# Patient Record
Sex: FEMALE | Race: WHITE | Employment: OTHER | URBAN - METROPOLITAN AREA
[De-identification: names, ages, dates, MRNs, and addresses within clinical notes are randomized per-mention and may not be internally consistent; named-entity substitution may affect disease eponyms.]

---

## 2018-08-10 ENCOUNTER — HOSPITAL ENCOUNTER (OUTPATIENT)
Dept: CT IMAGING | Age: 78
Discharge: HOME | DRG: 389 | End: 2018-08-10
Attending: PHYSICIAN ASSISTANT
Payer: MEDICARE

## 2018-08-10 ENCOUNTER — HOSPITAL ENCOUNTER (INPATIENT)
Age: 78
LOS: 1 days | Discharge: HOME | DRG: 389 | End: 2018-08-11
Attending: EMERGENCY MEDICINE | Admitting: INTERNAL MEDICINE
Payer: MEDICARE

## 2018-08-10 DIAGNOSIS — C50.911 MALIGNANT NEOPLASM OF RIGHT BREAST IN FEMALE, ESTROGEN RECEPTOR POSITIVE, UNSPECIFIED SITE OF BREAST (HCC): ICD-10-CM

## 2018-08-10 DIAGNOSIS — E83.42 HYPOMAGNESEMIA: ICD-10-CM

## 2018-08-10 DIAGNOSIS — K59.00 CONSTIPATION: ICD-10-CM

## 2018-08-10 DIAGNOSIS — R10.9 STOMACH CRAMPS: ICD-10-CM

## 2018-08-10 DIAGNOSIS — E87.6 HYPOKALEMIA: ICD-10-CM

## 2018-08-10 DIAGNOSIS — Z17.0 MALIGNANT NEOPLASM OF RIGHT BREAST IN FEMALE, ESTROGEN RECEPTOR POSITIVE, UNSPECIFIED SITE OF BREAST (HCC): ICD-10-CM

## 2018-08-10 DIAGNOSIS — K56.600 PARTIAL SMALL BOWEL OBSTRUCTION (HCC): Primary | ICD-10-CM

## 2018-08-10 DIAGNOSIS — R10.30 LOWER ABDOMINAL PAIN, UNSPECIFIED: ICD-10-CM

## 2018-08-10 PROBLEM — E46 MALNUTRITION (HCC): Status: ACTIVE | Noted: 2018-08-10

## 2018-08-10 PROBLEM — D64.9 ANEMIA: Status: ACTIVE | Noted: 2018-08-10

## 2018-08-10 PROBLEM — R63.4 WEIGHT LOSS: Status: ACTIVE | Noted: 2018-08-10

## 2018-08-10 PROBLEM — D72.829 LEUKOCYTOSIS: Status: ACTIVE | Noted: 2018-08-10

## 2018-08-10 LAB
ANION GAP SERPL CALC-SCNC: 11 MMOL/L (ref 5–15)
APPEARANCE UR: CLEAR
ATRIAL RATE: 91 BPM
BACTERIA URNS QL MICRO: NEGATIVE /HPF
BASOPHILS # BLD: 0 K/UL (ref 0–0.1)
BASOPHILS NFR BLD: 0 % (ref 0–1)
BILIRUB UR QL: NEGATIVE
BUN SERPL-MCNC: 15 MG/DL (ref 6–20)
BUN/CREAT SERPL: 16 (ref 12–20)
CALCIUM SERPL-MCNC: 8.7 MG/DL (ref 8.5–10.1)
CALCULATED R AXIS, ECG10: -41 DEGREES
CALCULATED T AXIS, ECG11: 36 DEGREES
CHLORIDE SERPL-SCNC: 100 MMOL/L (ref 97–108)
CO2 SERPL-SCNC: 27 MMOL/L (ref 21–32)
COLOR UR: ABNORMAL
CREAT BLD-MCNC: 1 MG/DL (ref 0.6–1.3)
CREAT SERPL-MCNC: 0.91 MG/DL (ref 0.55–1.02)
DIAGNOSIS, 93000: NORMAL
DIFFERENTIAL METHOD BLD: ABNORMAL
EOSINOPHIL # BLD: 0 K/UL (ref 0–0.4)
EOSINOPHIL NFR BLD: 0 % (ref 0–7)
EPITH CASTS URNS QL MICRO: ABNORMAL /LPF
ERYTHROCYTE [DISTWIDTH] IN BLOOD BY AUTOMATED COUNT: 14.2 % (ref 11.5–14.5)
GLUCOSE SERPL-MCNC: 108 MG/DL (ref 65–100)
GLUCOSE UR STRIP.AUTO-MCNC: NEGATIVE MG/DL
HCT VFR BLD AUTO: 33.1 % (ref 35–47)
HGB BLD-MCNC: 10.9 G/DL (ref 11.5–16)
HGB UR QL STRIP: ABNORMAL
HYALINE CASTS URNS QL MICRO: ABNORMAL /LPF (ref 0–5)
IMM GRANULOCYTES # BLD: 0 K/UL (ref 0–0.04)
IMM GRANULOCYTES NFR BLD AUTO: 0 % (ref 0–0.5)
KETONES UR QL STRIP.AUTO: NEGATIVE MG/DL
LEUKOCYTE ESTERASE UR QL STRIP.AUTO: NEGATIVE
LYMPHOCYTES # BLD: 0.9 K/UL (ref 0.8–3.5)
LYMPHOCYTES NFR BLD: 7 % (ref 12–49)
MAGNESIUM SERPL-MCNC: 1.4 MG/DL (ref 1.6–2.4)
MCH RBC QN AUTO: 29.3 PG (ref 26–34)
MCHC RBC AUTO-ENTMCNC: 32.9 G/DL (ref 30–36.5)
MCV RBC AUTO: 89 FL (ref 80–99)
MONOCYTES # BLD: 0.9 K/UL (ref 0–1)
MONOCYTES NFR BLD: 8 % (ref 5–13)
NEUTS SEG # BLD: 9.8 K/UL (ref 1.8–8)
NEUTS SEG NFR BLD: 84 % (ref 32–75)
NITRITE UR QL STRIP.AUTO: NEGATIVE
NRBC # BLD: 0 K/UL (ref 0–0.01)
NRBC BLD-RTO: 0 PER 100 WBC
PH UR STRIP: 6 [PH] (ref 5–8)
PLATELET # BLD AUTO: 249 K/UL (ref 150–400)
PMV BLD AUTO: 9.9 FL (ref 8.9–12.9)
POTASSIUM SERPL-SCNC: 2.4 MMOL/L (ref 3.5–5.1)
PROT UR STRIP-MCNC: NEGATIVE MG/DL
Q-T INTERVAL, ECG07: 370 MS
QRS DURATION, ECG06: 82 MS
QTC CALCULATION (BEZET), ECG08: 450 MS
RBC # BLD AUTO: 3.72 M/UL (ref 3.8–5.2)
RBC #/AREA URNS HPF: ABNORMAL /HPF (ref 0–5)
SODIUM SERPL-SCNC: 138 MMOL/L (ref 136–145)
SP GR UR REFRACTOMETRY: <1.005 (ref 1–1.03)
UR CULT HOLD, URHOLD: NORMAL
UROBILINOGEN UR QL STRIP.AUTO: 0.2 EU/DL (ref 0.2–1)
VENTRICULAR RATE, ECG03: 89 BPM
WBC # BLD AUTO: 11.7 K/UL (ref 3.6–11)
WBC URNS QL MICRO: ABNORMAL /HPF (ref 0–4)

## 2018-08-10 PROCEDURE — 74011250636 HC RX REV CODE- 250/636: Performed by: INTERNAL MEDICINE

## 2018-08-10 PROCEDURE — 85025 COMPLETE CBC W/AUTO DIFF WBC: CPT | Performed by: EMERGENCY MEDICINE

## 2018-08-10 PROCEDURE — 83735 ASSAY OF MAGNESIUM: CPT | Performed by: EMERGENCY MEDICINE

## 2018-08-10 PROCEDURE — 36415 COLL VENOUS BLD VENIPUNCTURE: CPT | Performed by: EMERGENCY MEDICINE

## 2018-08-10 PROCEDURE — 99284 EMERGENCY DEPT VISIT MOD MDM: CPT

## 2018-08-10 PROCEDURE — 80048 BASIC METABOLIC PNL TOTAL CA: CPT | Performed by: EMERGENCY MEDICINE

## 2018-08-10 PROCEDURE — 81001 URINALYSIS AUTO W/SCOPE: CPT | Performed by: EMERGENCY MEDICINE

## 2018-08-10 PROCEDURE — 82565 ASSAY OF CREATININE: CPT

## 2018-08-10 PROCEDURE — 74011250637 HC RX REV CODE- 250/637: Performed by: EMERGENCY MEDICINE

## 2018-08-10 PROCEDURE — 74177 CT ABD & PELVIS W/CONTRAST: CPT

## 2018-08-10 PROCEDURE — 74011250636 HC RX REV CODE- 250/636: Performed by: EMERGENCY MEDICINE

## 2018-08-10 PROCEDURE — 93005 ELECTROCARDIOGRAM TRACING: CPT

## 2018-08-10 PROCEDURE — 65270000029 HC RM PRIVATE

## 2018-08-10 PROCEDURE — 74011636320 HC RX REV CODE- 636/320: Performed by: RADIOLOGY

## 2018-08-10 RX ORDER — DIPHENHYDRAMINE HCL 25 MG
25 CAPSULE ORAL
Status: DISCONTINUED | OUTPATIENT
Start: 2018-08-10 | End: 2018-08-11 | Stop reason: HOSPADM

## 2018-08-10 RX ORDER — LUBIPROSTONE 24 UG/1
24 CAPSULE, GELATIN COATED ORAL 2 TIMES DAILY WITH MEALS
COMMUNITY

## 2018-08-10 RX ORDER — LISINOPRIL AND HYDROCHLOROTHIAZIDE 10; 12.5 MG/1; MG/1
1 TABLET ORAL
COMMUNITY
End: 2018-08-11

## 2018-08-10 RX ORDER — ONDANSETRON 2 MG/ML
4 INJECTION INTRAMUSCULAR; INTRAVENOUS
Status: DISCONTINUED | OUTPATIENT
Start: 2018-08-10 | End: 2018-08-11 | Stop reason: HOSPADM

## 2018-08-10 RX ORDER — LEVOTHYROXINE SODIUM 100 UG/1
50 TABLET ORAL EVERY OTHER DAY
COMMUNITY

## 2018-08-10 RX ORDER — ACETAMINOPHEN 325 MG/1
650 TABLET ORAL
Status: DISCONTINUED | OUTPATIENT
Start: 2018-08-10 | End: 2018-08-11 | Stop reason: HOSPADM

## 2018-08-10 RX ORDER — POTASSIUM CHLORIDE AND SODIUM CHLORIDE 900; 300 MG/100ML; MG/100ML
INJECTION, SOLUTION INTRAVENOUS CONTINUOUS
Status: DISCONTINUED | OUTPATIENT
Start: 2018-08-10 | End: 2018-08-11 | Stop reason: HOSPADM

## 2018-08-10 RX ORDER — POTASSIUM CHLORIDE 750 MG/1
80 TABLET, FILM COATED, EXTENDED RELEASE ORAL
Status: DISCONTINUED | OUTPATIENT
Start: 2018-08-10 | End: 2018-08-10

## 2018-08-10 RX ORDER — LEVOTHYROXINE SODIUM 100 UG/1
100 TABLET ORAL EVERY OTHER DAY
COMMUNITY

## 2018-08-10 RX ORDER — ENOXAPARIN SODIUM 100 MG/ML
30 INJECTION SUBCUTANEOUS EVERY 24 HOURS
Status: DISCONTINUED | OUTPATIENT
Start: 2018-08-10 | End: 2018-08-11 | Stop reason: HOSPADM

## 2018-08-10 RX ORDER — ENOXAPARIN SODIUM 100 MG/ML
40 INJECTION SUBCUTANEOUS EVERY 24 HOURS
Status: DISCONTINUED | OUTPATIENT
Start: 2018-08-10 | End: 2018-08-10 | Stop reason: DRUGHIGH

## 2018-08-10 RX ORDER — POTASSIUM CHLORIDE 750 MG/1
40 TABLET, FILM COATED, EXTENDED RELEASE ORAL
Status: COMPLETED | OUTPATIENT
Start: 2018-08-10 | End: 2018-08-10

## 2018-08-10 RX ORDER — MAGNESIUM SULFATE HEPTAHYDRATE 40 MG/ML
2 INJECTION, SOLUTION INTRAVENOUS ONCE
Status: COMPLETED | OUTPATIENT
Start: 2018-08-10 | End: 2018-08-10

## 2018-08-10 RX ORDER — TAMOXIFEN CITRATE 20 MG/1
20 TABLET ORAL
COMMUNITY

## 2018-08-10 RX ADMIN — POTASSIUM CHLORIDE 40 MEQ: 750 TABLET, EXTENDED RELEASE ORAL at 14:44

## 2018-08-10 RX ADMIN — MAGNESIUM SULFATE HEPTAHYDRATE 2 G: 40 INJECTION, SOLUTION INTRAVENOUS at 16:33

## 2018-08-10 RX ADMIN — IOPAMIDOL 97 ML: 755 INJECTION, SOLUTION INTRAVENOUS at 07:25

## 2018-08-10 RX ADMIN — ENOXAPARIN SODIUM 30 MG: 30 INJECTION SUBCUTANEOUS at 16:32

## 2018-08-10 RX ADMIN — SODIUM CHLORIDE 500 ML: 900 INJECTION, SOLUTION INTRAVENOUS at 13:48

## 2018-08-10 RX ADMIN — SODIUM CHLORIDE AND POTASSIUM CHLORIDE: 9; 2.98 INJECTION, SOLUTION INTRAVENOUS at 14:44

## 2018-08-10 RX ADMIN — POTASSIUM CHLORIDE 40 MEQ: 750 TABLET, EXTENDED RELEASE ORAL at 13:49

## 2018-08-10 NOTE — CONSULTS
Surgery Consult    Subjective: Devon Vo is a 76 y.o. female who I was asked to evaluate for abd cramps and CT scan that shows evidence of a partial bowel obstruction. Her symptoms go back at least a month when she was treated by her PCP for a UTI. She was given sulfa and she threw up. Her antibiotic was changed but she has had intermittent crampy abd pain since then. She has been prescribed miralax and metamucil, with only partial success. She had seen the PA at [facility] who had ordered a CT scan. When the scan showed a partial bowel obstruction she was told to come to the ER. Patient states that she has not had any vomiting apart from that first episode. She is passing flatus. She has lost about ten pounds over the last month. Her appetite is good but she does not eat well because of the intermittent crampy abd pain. She denies any previous abd surgeries but she has had a mastectomy for breast cancer at [facility]. Past Medical History:   Diagnosis Date    Breast CA ([address]     Rightside    Hypertension      Past Surgical History:   Procedure Laterality Date    HX MASTECTOMY Right     HX PARTIAL THYROIDECTOMY        No family history on file.   Social History     Social History    Marital status:      Spouse name: N/A    Number of children: N/A    Years of education: N/A     Social History Main Topics    Smoking status: Not on file    Smokeless tobacco: Not on file    Alcohol use Not on file    Drug use: Not on file    Sexual activity: Not on file     Other Topics Concern    Not on file     Social History Narrative    No narrative on file      Current Facility-Administered Medications   Medication Dose Route Frequency    sodium chloride 0.9 % bolus infusion 500 mL  500 mL IntraVENous ONCE    potassium chloride SR (KLOR-CON 10) tablet 40 mEq  40 mEq Oral Q1H     Current Outpatient Prescriptions   Medication Sig    lisinopril-hydroCHLOROthiazide (Joy Calvillo) 10-12.5 mg per tablet Take 1 Tab by mouth nightly.  levothyroxine (SYNTHROID) 100 mcg tablet Take 50 mcg by mouth every other day.  tamoxifen (NOLVADEX) 20 mg tablet Take 20 mg by mouth nightly.  lubiPROStone (AMITIZA) 24 mcg capsule Take 24 mcg by mouth two (2) times daily (with meals).  levothyroxine (SYNTHROID) 100 mcg tablet Take 100 mcg by mouth every other day. Allergies   Allergen Reactions    Sulfa (Sulfonamide Antibiotics) Nausea and Vomiting       Review of Systems:REVIEW OF SYSTEMS:     []     Unable to obtain  ROS due to  []    mental status change  []    sedated   []    intubated   []    Total of 12 systems reviewed as follows:  Constitutional: negative fever, negative chills, negative weight loss  Eyes:   negative visual changes  ENT:   negative sore throat, tongue or lip swelling  Respiratory:  negative cough, negative dyspnea  Cards:  negative for chest pain, palpitations, lower extremity edema  GI:   negative for nausea, vomiting, diarrhea, and abdominal pain  :  negative for frequency, dysuria  Integument:  negative for rash and pruritus  Heme:  negative for easy bruising and gum/nose bleeding  Musculoskel: negative for myalgias,  back pain and muscle weakness  Neuro:  negative for headaches, dizziness, vertigo  Psych:  negative for feelings of anxiety, depression     Objective:      Patient Vitals for the past 8 hrs:   BP Temp Pulse Resp SpO2 Height Weight   08/10/18 1341 145/72 - 87 17 98 % - -   08/10/18 1051 131/73 97.8 °F (36.6 °C) 98 16 96 % 4' 11\" (1.499 m) 44 kg (97 lb)       Temp (24hrs), Av.8 °F (36.6 °C), Min:97.8 °F (36.6 °C), Max:97.8 °F (36.6 °C)      Physical Exam:  General:  Alert, cooperative, no distress, appears stated age. Eyes:  Conjunctivae/corneas clear. PERRL, EOMs intact. Nose: Nares normal. Septum midline. Mucosa normal. No drainage or sinus tenderness.    Mouth/Throat: Lips, mucosa, and tongue normal. Teeth and gums normal.   Neck: Supple, symmetrical, trachea midline, no adenopathy, thyroid: no enlargment/tenderness/nodules, no carotid bruit and no JVD. Back:   Symmetric, no curvature. ROM normal. No CVA tenderness. Lungs:   Clear to auscultation bilaterally. Heart:  Regular rate and rhythm, S1, S2 normal, no murmur, click, rub or gallop. Abdomen:   Soft, non-tender. Bowel sounds normal. No masses,  No organomegaly. Extremities: Extremities normal, atraumatic, no cyanosis or edema. Pulses: 2+ and symmetric all extremities. Skin: Skin color, texture, turgor normal. No rashes or lesions   Lymph nodes: Cervical, supraclavicular, and axillary nodes normal.     Labs: Recent Labs      08/10/18   1306   WBC  11.7*   HGB  10.9*   HCT  33.1*   PLT  249     Recent Labs      08/10/18   1214   NA  138   K  2.4*   CL  100   CO2  27   GLU  108*   BUN  15   CREA  0.91   CA  8.7   MG  1.4*     No results for input(s): INR in the last 72 hours. No lab exists for component: INREXT     CT scan was reviewed - this does show dilated small bowel loops         Assessment:     abd pain and weight loss    Plan:     I am uncertain as to the cause of her current symptoms  She does not appear to have an acute abdomen requiring surgery   Her problem appears to be chronic and she needs further workup for her weight loss, abd pain and constipation. I would recommend a GI consult and workup - she does not require any urgent surgical intervention currently.      Signed By: Branden Jasso MD     August 10, 2018

## 2018-08-10 NOTE — CONSULTS
[id number] [facility] at [address] [place] [address]  [phone]    Hematology / Oncology Consult    Reason for Visit:   Jud Quigley is a 76 y.o. female who is seen in consultation at the request of Dr. Janeen Ospina for evaluation of SBO in setting of breast cancer. Hematology Oncology Treatment History:     Diagnosis: Breast cancer 2002 (with positive axillary LN) and 2016     Pathology: 2016 mastectomy - 2.8cm tumor, T2Nx; ER positive, DC negative, Ege5tjp negative. Oncotype score 24. Prior Treatment:   1. Right breast lumpectomy and axillary LN dissection 2002  2. Adjuvant radiation 2002  3. Adjuvant chemotherapy 2002  4. Adjuvant Tamoxifen x 5 yrs, followed by few years of Letrozole  5. Right breast mastectomy 10/19/2016    Current Treatment: Anastrazole started 2016, switched to Tamoxifen shortly afterwards and remains on this now. History of Present Illness:   Ms. Asia Garcia is a 75 y/o female with h/o right-sided breast cancer, currently on Anastazole who is admitted with small bowel obstruction. Review of [facility] notes state that patient has treatment history as detailed above and was last seen in their clinic in 12/2017, doing well with plan to continue on Anastrazole for 5-10 yrs. She also followed up with Surgical Oncology, Dr. Brooke Avila in 3/2018, with report of no evidence of disease. Patient reports she followed by with Dr. Mckay Grey in [facility] again this past 6/2018 with no problems. Patient states she was recently treated for a UTI with Bactrim. Ever since taking that medication, she has had lower abdominal pain. Her PCP ordered a CT of her abdomen today which was notable for partial small bowel obstruction. Patient was directed to the ER. She has been evaluated by [address] and awaiting a GI physician. Patient denies any n/v/diarrhea. She actually states that she feels better and she was told she is allowed to eat.  She is sitting up in bed eating dinner during my interview. Past Medical History:   Diagnosis Date    Breast CA ([address]     Rightside    Hypertension     Hypothyroidism       Past Surgical History:   Procedure Laterality Date    HX MASTECTOMY Right     HX PARTIAL THYROIDECTOMY        Social History   Substance Use Topics    Smoking status: Not on file    Smokeless tobacco: Not on file    Alcohol use Not on file      No family history on file. Current Facility-Administered Medications   Medication Dose Route Frequency    0.9% sodium chloride with KCl 40 mEq/L infusion   IntraVENous CONTINUOUS    magnesium sulfate 2 g/50 ml IVPB (premix or compounded)  2 g IntraVENous ONCE    acetaminophen (TYLENOL) tablet 650 mg  650 mg Oral Q4H PRN    diphenhydrAMINE (BENADRYL) capsule 25 mg  25 mg Oral Q4H PRN    ondansetron (ZOFRAN) injection 4 mg  4 mg IntraVENous Q4H PRN    enoxaparin (LOVENOX) injection 30 mg  30 mg SubCUTAneous Q24H     Current Outpatient Prescriptions   Medication Sig    lisinopril-hydroCHLOROthiazide (PRINZIDE, ZESTORETIC) 10-12.5 mg per tablet Take 1 Tab by mouth nightly.  levothyroxine (SYNTHROID) 100 mcg tablet Take 50 mcg by mouth every other day.  tamoxifen (NOLVADEX) 20 mg tablet Take 20 mg by mouth nightly.  lubiPROStone (AMITIZA) 24 mcg capsule Take 24 mcg by mouth two (2) times daily (with meals).  levothyroxine (SYNTHROID) 100 mcg tablet Take 100 mcg by mouth every other day. Allergies   Allergen Reactions    Sulfa (Sulfonamide Antibiotics) Nausea and Vomiting        Review of Systems: A complete review of systems was obtained, negative except as described above. Physical Exam:     Visit Vitals    /64    Pulse 88    Temp 97.8 °F (36.6 °C)    Resp 19    Ht 4' 11\" (1.499 m)    Wt 97 lb (44 kg)    SpO2 98%    BMI 19.59 kg/m2     ECOG PS: 0  General: No distress, sitting up eating her dinner.    Eyes: PERRLA, anicteric sclerae  HENT: Atraumatic with normal appearance of ears and nose; OP clear  Neck: Supple; no thyromegaly   Lymphatic: No cervical, supraclavicular, or axillary adenopathy  Respiratory: CTAB, normal respiratory effort  CV: Normal rate, regular rhythm, no murmurs, no peripheral edema  GI: Soft, nontender, nondistended, no masses, no hepatomegaly, no splenomegaly  MS: Normal gait and station. Digits without clubbing or cyanosis. Skin: No rashes, ecchymoses, or petechiae. Normal temperature, turgor, and texture. Neuro/Psych: Alert, oriented, appropriate affect, normal judgment/insight      Results:     Lab Results   Component Value Date/Time    WBC 11.7 (H) 08/10/2018 01:06 PM    HGB 10.9 (L) 08/10/2018 01:06 PM    HCT 33.1 (L) 08/10/2018 01:06 PM    PLATELET 356 89/24/5259 01:06 PM    MCV 89.0 08/10/2018 01:06 PM    ABS. NEUTROPHILS 9.8 (H) 08/10/2018 01:06 PM     Lab Results   Component Value Date/Time    Sodium 138 08/10/2018 12:14 PM    Potassium 2.4 (LL) 08/10/2018 12:14 PM    Chloride 100 08/10/2018 12:14 PM    CO2 27 08/10/2018 12:14 PM    Glucose 108 (H) 08/10/2018 12:14 PM    BUN 15 08/10/2018 12:14 PM    Creatinine 0.91 08/10/2018 12:14 PM    GFR est AA >60 08/10/2018 12:14 PM    GFR est non-AA 60 (L) 08/10/2018 12:14 PM    Calcium 8.7 08/10/2018 12:14 PM    Creatinine (POC) 1.0 08/10/2018 07:16 AM     No results found for: TBILI, ALT, SGOT, AP, TP, ALB, GLOB    Abd/pelvis CT 8/10/18: IMPRESSION:  Multiple dilated small bowel loops, compatible with partial small bowel  obstruction. Small ascites. Tiny hepatic hypodensities are too small to  characterize but likely represent cysts. Probable small bilateral renal cysts. Small amount of fluid within the endometrial canal; correlate with clinical  findings.       Assessment and Recommendations: Everett Ricci is a 76 y.o. female with h/o breast cancer p/w abdominal pain and partial small bowel obstruction.     1. H/o R-sided breast cancer: Stage III in 2002, treated with surgery, adjuvant chemotherapy, adjuvant radiation and adjuvant endocrine therapy. She then developed R breast recurrence in 2016, underwent right mastectomy and remains now on tamoxifen. She follows closely with MedOnc and SurgOnc at [facility] and has no evidence of disease recurrence. She takes Tamoxifen which she has taken and tolerated for multiple years in the past and is not associated with small bowel obstruction. It is associated with 1% incidence of abdominal cramps, but this is a long term medication for this patient. Symptoms started after Bactrim and UTI. -- She knows to follow up with [facility] as scheduled in 6 months    2. Partial small bowel obstruction: Unclear why she has dilated loops of bowel. Pt does not currently have symptoms of obstruction as she is eating and drinking without any n/v. Evaluated by surgery who does not recommend any acute intervention. They recommend GI evaluation for the chronic crampy abdominal pain. I do not feel Tamoxifen is playing a role in imaging or current symptoms. -- GI evaluation pending  -- I also recommend outpatient evaluation by Gyn given fluid in endometrial canal and small ascites.      3. Hypokalemia: Getting repletion today with recheck in am.        Signed By: Lisbeth Xiong MD     August 10, 2018

## 2018-08-10 NOTE — H&P
[facility]    Hospitalist Admission Note      NAME:  Vanessa Quesada   :   1940   MRN:  [id number]     PCP:  Lorie Church MD     Date/Time:  8/10/2018 3:03 PM          Subjective:     CHIEF COMPLAINT: abdominal cramping and weight loss    HISTORY OF PRESENT ILLNESS:     Ms. Dominick Webber is a 76 y.o.  female who presented to the Emergency Department complaining of abdominal cramping and 10 pound weight loss over 1 month. Issues started after a UTI that had resolved on Abx, but an initial dose of Bactrim led to vomiting, and cramping has persisted since. This has not been relieved by laxatives, enemas or pepcid. She was assessed by PCP and GI, who ordered a CT today, which showed signs of partial small bowel obstruction. She reports that she has bowel movement, that stool is soft and formed mostly, occasionally more wet and urgent after laxatives, never any blood. She was seen in ER by surgery, who did not feel her symptoms nor CT findings represented a surgical issue. We will admit her for management. Past Medical History:   Diagnosis Date    Breast CA ([address]     Rightside    Hypertension     Hypothyroidism         Past Surgical History:   Procedure Laterality Date    HX MASTECTOMY Right     HX PARTIAL THYROIDECTOMY         Social History   Substance Use Topics    Smoking status: Not on file    Smokeless tobacco: Not on file    Alcohol use Not on file        No family history on file. Patient is unsure of health issues of parents, unsure if abd issues. Allergies   Allergen Reactions    Sulfa (Sulfonamide Antibiotics) Nausea and Vomiting        Prior to Admission medications    Medication Sig Start Date End Date Taking? Authorizing Provider   lisinopril-hydroCHLOROthiazide (PRINZIDE, ZESTORETIC) 10-12.5 mg per tablet Take 1 Tab by mouth nightly. Yes Historical Provider   levothyroxine (SYNTHROID) 100 mcg tablet Take 50 mcg by mouth every other day.    Yes Historical Provider   tamoxifen (NOLVADEX) 20 mg tablet Take 20 mg by mouth nightly. Yes Historical Provider   lubiPROStone (AMITIZA) 24 mcg capsule Take 24 mcg by mouth two (2) times daily (with meals). Yes Historical Provider   levothyroxine (SYNTHROID) 100 mcg tablet Take 100 mcg by mouth every other day. Historical Provider       Review of Systems:  (bold if positive, if negative)    Gen:  weight lossfatigueEyes:  ENT:  CVS:  Pulm:  GI:  Abdominal pain  :    MS:  Skin:  Psych:  Endo:    Hem:  Renal:    Neuro:        Objective:      VITALS:    Vital signs reviewed; most recent are:    Visit Vitals    /73    Pulse 98    Temp 97.8 °F (36.6 °C)    Resp 24    Ht 4' 11\" (1.499 m)    Wt 44 kg (97 lb)    SpO2 98%    BMI 19.59 kg/m2     SpO2 Readings from Last 6 Encounters:   08/10/18 98%        No intake or output data in the 24 hours ending 08/10/18 1503     Exam:     Physical Exam:    Gen:  Thin, frail, in no acute distress  HEENT:  Pink conjunctivae, PERRL, hearing intact to voice, dry mucous membranes  Neck:  Supple, without masses, thyroid non-tender  Resp:  No accessory muscle use, clear breath sounds without wheezes rales or rhonchi  Card:  No murmurs, normal S1, S2 without thrills, bruits or peripheral edema  Abd:  Soft, non-tender, non-distended, normoactive bowel sounds are present, no mass  Lymph:  No cervical or inguinal adenopathy  Musc:  No cyanosis or clubbing  Skin:  No rashes or ulcers, skin turgor is reduced  Neuro:  Cranial nerves are grossly intact, general motor weakness, follows commands   Psych:   Moderate insight, oriented to person, place and time, alert     Labs:    Recent Labs      08/10/18   1306   WBC  11.7*   HGB  10.9*   HCT  33.1*   PLT  249     Recent Labs      08/10/18   1214   NA  138   K  2.4*   CL  100   CO2  27   GLU  108*   BUN  15   CREA  0.91   CA  8.7   MG  1.4*     No results found for: GLUCPOC  No results for input(s): PH, PCO2, PO2, HCO3, FIO2 in the last 72 hours. No results for input(s): INR in the last 72 hours. No lab exists for component: INREXT  All Micro Results     Procedure Component Value Units Date/Time    URINE CULTURE HOLD SAMPLE [[id number]] Collected:  08/10/18 1215    Order Status:  Completed Specimen:  Urine from Serum Updated:  08/10/18 1224     Urine culture hold         URINE ON HOLD IN MICROBIOLOGY DEPT FOR 3 DAYS. IF UNPRESERVED URINE IS SUBMITTED, IT CANNOT BE USED FOR ADDITIONAL TESTING AFTER 24 HRS, RECOLLECTION WILL BE REQUIRED. I have reviewed previous records       Assessment and Plan:      Partial small bowel obstruction / Abdominal pain - POA, unclear etiology. Not constipation, as she reports movement of stool, CT shows no mass of stool, and she is not vomiting. GI consult. Diet as tolerated. She does not want to be in the hospital. Hydrate overnight. Leukocytosis - POA, mild, unclear etiology. No sign of infection on CT. No Abx for now. Anemia - POA, mild, unclear etiology. Check hemoccult and serologies. Weight loss / Malnutrition / Hypokalemia - POA, due to poor PO intake, due to cramping. Diet as tolerated. GI can comment. Hypertension - Hold Prinzide for today. Hx Breast CA - Currently on tamoxifen. Consult onc to comment if they think abd symptoms could be side effect that requires a different plan. Hypothyroidism - Continue synthroid.  Convert to 75 daily     Telemetry reviewed:   normal sinus rhythm    Risk of deterioration: high      Total time spent with patient: 79 [address] [facility] discussed with: Patient, Family, Nursing Staff, Consultant/Specialist and >50% of time spent in counseling and coordination of care    Discussed:  Care Plan       ___________________________________________________    Attending Physician: Ted Montiel MD

## 2018-08-10 NOTE — ED TRIAGE NOTES
Patient adds that she already had a CT of abdomen today which confirmed \"problem with small intestine. \"

## 2018-08-10 NOTE — PROGRESS NOTES
[facility] Dosing Services: 8/10/2018    Enoxaparin by Dr. Nikkie Wiley made for this 76 y.o. female, for prophylaxis. Wt Readings from Last 1 Encounters:   08/10/18 44 kg (97 lb)       Ht Readings from Last 1 Encounters:   08/10/18 149.9 cm (59\")      Previous Dose 40 mg every 24 hours   Creatinine Clearance Estimated Creatinine Clearance: 34.8 mL/min (based on Cr of 0.91). Creatinine Lab Results   Component Value Date/Time    Creatinine 0.91 08/10/2018 12:14 PM    Creatinine (POC) 1.0 08/10/2018 07:16 AM       Platelet Lab Results   Component Value Date/Time    PLATELET 839 90/20/4269 01:06 PM      H/H Lab Results   Component Value Date/Time    HGB 10.9 (L) 08/10/2018 01:06 PM          Pharmacist made change to enoxaparin therapy based on:  [ X ] Low body weight : dose changed to: 30 mg every 24 hours  Protocol Female 35-45 kg    Pharmacy to monitor patients progress. Will make dose adjustment as needed per changing renal function. Will communicate further recommendations regarding patients anticoagulation therapy with prescriber. Signed Lenka Vyas .  Contact information: [phone]

## 2018-08-10 NOTE — PROGRESS NOTES
GI Note (we have not been asked to evaluate patient but she's been evaluated in office and with CT scan showing partial SBO advised ED)    Brief Summary from Office Visit 7/24/18:  Patient is here today for an initial visit. She states that in the beginning of July she was having some issues with constipation and diagnosed with UTI. Put on Bactrim for treatment. She states that within 3 hours she was vomiting. Antibiotic was changed and then she was given antiemetic medication. She completed the antibiotics but also noted she was constipated. Recommended to try Miralax but she states that made her feels worse and nauseated. Also tried 2 suppositories last week with minimal results. Constipated is associated with lower abdominal cramps. Saw PCP 1 week ago and did an enema with good results. However, that caused some reflux symptoms. Started on Prilosec and symptoms improved. Then 2 days ago she felt that her symptoms were returning. Describes as cramps in the lower that is worse with eating. Associated nausea, no vomiting. Continues to feel constipated. Does not drink a lot of water daily. Bowel movements are still not daily. Not passing large amounts and has to strain. No visible blood in stool. Prior to taking medication she did not have any issue with her bowel movements or stomach cramps. Colonoscopy 2008 - large internal and external hemorrhoids and right colonic diverticulosis     Diagnosis: constipation and stomach cramps    Assessment: 67yo female with constipation after being on antibiotics. Constipation is likely the cause of her intermittent stomach cramps. No alarm symptoms present. Will start conservative management for constipation (try Metamucil daily since she did not tolerate Miralax and I believe Rx medication will be too strong for her) and dicyclomine as needed for stomach cramps.  Consider additional testing if no improvement    Treatment:  Metamucil 3.4 gram/7 gram 1 teaspoon dissolved in water once a day  dicyclomine 10 mg Take 1 capsule by mouth twice a day as needed  Ms. Anderson Clubs: continue to increase your water intake daily  Follow-up office visit in 2-4 weeks    *END OF SUMMARY*    Since office visit I have been in touch with patient via telephone. She continued to have intermittent symptoms of abdominal cramping that was worse with eating. Amitiza was added to her regimen and symptoms initially improved and constipation was better. However, symptoms again returned and since she did not have complete resolution of symptoms I decided to proceed with CT scan for further evaluation. Received report from radiologist that CT scan today shows findings consistent with partial SBO. Contacted patient with results and advised ED for likely admission. Patient currently in ED and receiving further workup. Would recommend surgical consultation.     Chris Estrella PA-C  08/10/18  11:03 AM

## 2018-08-10 NOTE — IP AVS SNAPSHOT
[address] 
[phone] Patient: Citlali Oviedo MRN: [id number] :1940 A check javid indicates which time of day the medication should be taken. My Medications START taking these medications Instructions Each Dose to Equal  
 Morning Noon Evening Bedtime  
 lisinopril 10 mg tablet Commonly known as:  Mollie Ripa Your last dose was: Your next dose is: Take 1 Tab by mouth nightly. 10 mg  
    
   
   
   
  
 ondansetron 4 mg disintegrating tablet Commonly known as:  ZOFRAN ODT Your last dose was: Your next dose is: Take 1 Tab by mouth every eight (8) hours as needed for Nausea. 4 mg  
    
   
   
   
  
 senna-docusate 8.6-50 mg per tablet Commonly known as:  SENNA WITH DOCUSATE SODIUM Your last dose was: Your next dose is: Take 1 Tab by mouth two (2) times a day. 1 Tab CONTINUE taking these medications Instructions Each Dose to Equal  
 Morning Noon Evening Bedtime AMITIZA 24 mcg capsule Generic drug:  lubiPROStone Your last dose was: Your next dose is: Take 24 mcg by mouth two (2) times daily (with meals). 24 mcg * levothyroxine 100 mcg tablet Commonly known as:  SYNTHROID Your last dose was: Your next dose is: Take 100 mcg by mouth every other day. 100 mcg * levothyroxine 100 mcg tablet Commonly known as:  SYNTHROID Your last dose was: Your next dose is: Take 50 mcg by mouth every other day. 50 mcg  
    
   
   
   
  
 tamoxifen 20 mg tablet Commonly known as:  NOLVADEX Your last dose was: Your next dose is: Take 20 mg by mouth nightly.   
 20 mg  
    
   
   
   
  
 * Notice: This list has 2 medication(s) that are the same as other medications prescribed for you. Read the directions carefully, and ask your doctor or other care provider to review them with you. STOP taking these medications   
 lisinopril-hydroCHLOROthiazide 10-12.5 mg per tablet Commonly known as:  Regan Morales Where to Get Your Medications Information on where to get these meds will be given to you by the nurse or doctor. ! Ask your nurse or doctor about these medications  
  lisinopril 10 mg tablet  
 ondansetron 4 mg disintegrating tablet  
 senna-docusate 8.6-50 mg per tablet

## 2018-08-10 NOTE — ED TRIAGE NOTES
Patient presents with 3-week history of intermittent abdominal pain and constipation. Referred here by GI for evaluation of possible SBO.

## 2018-08-10 NOTE — PROGRESS NOTES
[facility]: MED RECONCILIATION    Information obtained from: Patient     Allergies: Sulfa (sulfonamide antibiotics)    Comment:  Patient has not taken any meds today. Prior to Admission Medications:     Medication Documentation Review Audit       Reviewed by Marisol Castro PHARMD (Pharmacist) on 08/10/18 at 1320         Medication Sig Documenting Provider Last Dose Status Taking?      levothyroxine (SYNTHROID) 100 mcg tablet Take 100 mcg by mouth every other day. Historical Provider Unknown Active No    levothyroxine (SYNTHROID) 100 mcg tablet Take 50 mcg by mouth every other day. Historical Provider Unknown Active Yes    lisinopril-hydroCHLOROthiazide (PRINZIDE, ZESTORETIC) 10-12.5 mg per tablet Take 1 Tab by mouth nightly. Historical Provider Not today Active Yes    lubiPROStone (AMITIZA) 24 mcg capsule Take 24 mcg by mouth two (2) times daily (with meals). Historical Provider 8/8/2018 Active Yes    tamoxifen (NOLVADEX) 20 mg tablet Take 20 mg by mouth nightly.  Historical Provider Not today Active Yes                  LORI Price   Contact: [id number]

## 2018-08-10 NOTE — IP AVS SNAPSHOT
303 [facility] 
 
 
 [address] [facility] 1007 [facility] 
[phone] Patient: Bess Schmidt MRN: [id number] :1940 About your hospitalization You were admitted on:  August 10, 2018 You last received care in the:  [facility] 5M1 MED SURG 1 You were discharged on:  2018 Why you were hospitalized Your primary diagnosis was:  Partial Small Bowel Obstruction (Hcc) Your diagnoses also included:  Leukocytosis, Anemia, Hypokalemia, Hypothyroidism, Hypertension, Breast Ca (Hcc), Weight Loss, Malnutrition (Hcc), Abdominal Pain Follow-up Information Follow up With Details Comments Contact Info Bety Shah MD Schedule an appointment as soon as possible for a visit in 1 week  [address] 
[phone] Steve Skinner MD Schedule an appointment as soon as possible for a visit in 1 week  . [address] 
[phone] Discharge Orders None A check javid indicates which time of day the medication should be taken. My Medications START taking these medications Instructions Each Dose to Equal  
 Morning Noon Evening Bedtime  
 lisinopril 10 mg tablet Commonly known as:  Nonah Kristal Your last dose was: Your next dose is: Take 1 Tab by mouth nightly. 10 mg  
    
   
   
   
  
 ondansetron 4 mg disintegrating tablet Commonly known as:  ZOFRAN ODT Your last dose was: Your next dose is: Take 1 Tab by mouth every eight (8) hours as needed for Nausea. 4 mg  
    
   
   
   
  
 senna-docusate 8.6-50 mg per tablet Commonly known as:  SENNA WITH DOCUSATE SODIUM Your last dose was: Your next dose is: Take 1 Tab by mouth two (2) times a day. 1 Tab CONTINUE taking these medications Instructions Each Dose to Equal  
 Morning Noon Evening Bedtime AMITIZA 24 mcg capsule Generic drug:  lubiPROStone Your last dose was: Your next dose is: Take 24 mcg by mouth two (2) times daily (with meals). 24 mcg * levothyroxine 100 mcg tablet Commonly known as:  SYNTHROID Your last dose was: Your next dose is: Take 100 mcg by mouth every other day. 100 mcg * levothyroxine 100 mcg tablet Commonly known as:  SYNTHROID Your last dose was: Your next dose is: Take 50 mcg by mouth every other day. 50 mcg  
    
   
   
   
  
 tamoxifen 20 mg tablet Commonly known as:  NOLVADEX Your last dose was: Your next dose is: Take 20 mg by mouth nightly. 20 mg  
    
   
   
   
  
 * Notice: This list has 2 medication(s) that are the same as other medications prescribed for you. Read the directions carefully, and ask your doctor or other care provider to review them with you. STOP taking these medications   
 lisinopril-hydroCHLOROthiazide 10-12.5 mg per tablet Commonly known as:  Robyn Mancia Where to Get Your Medications Information on where to get these meds will be given to you by the nurse or doctor. ! Ask your nurse or doctor about these medications  
  lisinopril 10 mg tablet  
 ondansetron 4 mg disintegrating tablet  
 senna-docusate 8.6-50 mg per tablet Discharge Instructions HOSPITALIST DISCHARGE INSTRUCTIONS 
NAME: Mason Nicole :  1940 MRN:  [id number] Date/Time:  2018 10:59 AM 
 
ADMIT DATE: 8/10/2018 DISCHARGE DATE: 2018 ADMITTING DIAGNOSIS: 
Partial small bowel obstruction DISCHARGE DIAGNOSIS: 
same MEDICATIONS: 
See after visit summary · It is important that you take the medication exactly as they are prescribed. · Keep your medication in the bottles provided by the pharmacist and keep a list of the medication names, dosages, and times to be taken in your wallet. · Do not take other medications without consulting your doctor Pain Management: per above medications What to do at [facility] Recommended diet:  [place] diet, low fat Recommended activity: Activity as tolerated 1) Return to the hospital if you feel worse 2) If you experience any of the following symptoms then please call your primary care physician or return to the emergency room if you cannot get hold of your doctor: 
Fever, chills, nausea, vomiting, diarrhea, change in mentation, falling, bleeding, shortness of breath, chest pain, severe headache, severe abdominal pain, 3) Stop taking HCTZ (blood pressure medication) due to dehydration, low blood pressure. You can still take the low dose lisinopril 4) Follow up with GI doctors for an EGD and colonoscopy Follow Up: Follow-up Information Follow up With Details Comments Contact Info Ciaran Laurent MD Schedule an appointment as soon as possible for a visit in 1 week  [address] 
[phone] Cynthia Vanegas MD Schedule an appointment as soon as possible for a visit in 1 week  . [address] [place] [address] 
[phone] Information obtained by : 
I understand that if any problems occur once I am at home I am to contact my physician. I understand and acknowledge receipt of the instructions indicated above. Physician's or R.N.'s Signature                                                                  Date/Time Patient or Representative Signature                                                          Date/Time Bowel Blockage (Intestinal Obstruction): Care Instructions Your Care Instructions A bowel blockage, also called an intestinal obstruction, can prevent gas, fluids, or solids from moving through the intestines normally. It can cause constipation and, rarely, diarrhea. You may have pain, nausea, vomiting, and cramping. Most of the time, complete blockages require a stay in the hospital and possibly surgery. But if your bowel is only partly blocked, your doctor may tell you to wait until it clears on its own and you are able to pass gas and stool. If so, there are things you can do at home to help make you feel better. If you have had surgery for a bowel blockage, there are things you can do at home to make sure you heal well. You can also make some changes to keep your bowel from becoming blocked again. Follow-up care is a key part of your treatment and safety. Be sure to make and go to all appointments, and call your doctor if you are having problems. It's also a good idea to know your test results and keep a list of the medicines you take. How can you care for yourself at home? If your doctor has told you to wait at home for a blockage to clear on its own: · Follow your doctor's instructions. These may include eating a liquid diet to avoid complete blockage. · Be safe with medicines. Take your medicines exactly as prescribed. Call your doctor if you think you are having a problem with your medicine. · Put a heating pad set on low on your belly to relieve mild cramps and pain. To prevent another blockage · Try to eat smaller amounts of food more often. For example, have 5 or 6 small meals throughout the day instead of 2 or 3 large meals. · Chew your food very well. Try to chew each bite about 20 times or until it is liquid. · Avoid high-fiber foods and raw fruits and vegetables with skins, husks, strings, or seeds. These can form a ball of undigested material that can cause a blockage if a part of your bowel is scarred or narrowed. · Check with your doctor before you eat whole-grain products or use a fiber supplement such as Citrucel or Metamucil. · To help you have regular bowel movements, eat at regular times, do not strain during a bowel movement, and drink at least 8 to 10 glasses of water each day. If you have kidney, heart, or liver disease and have to limit fluids, talk with your doctor or before you increase the amount of fluids you drink. · Drink high-calorie liquid formulas if your doctor says to. Severe symptoms may make it hard for your body to take in vitamins and minerals. · Get regular exercise. It helps you digest your food better. Get at least 30 minutes of physical activity on most days of the week. Walking is a good choice. When should you call for help? Call your doctor now or seek immediate medical care if: 
  · You have a fever.  
  · You are vomiting.  
  · You have new or worse belly pain.  
  · You cannot pass stools or gas.  
 Watch closely for changes in your health, and be sure to contact your doctor if you have any problems. Where can you learn more? Go to http://meryl-madhuri.info/. Enter K203 in the search box to learn more about \"Bowel Blockage (Intestinal Obstruction): Care Instructions. \" Current as of: May 12, 2017 Content Version: 11.7 © 2194-4445 [employer]. Care instructions adapted under license by [employer] (which disclaims liability or warranty for this information). If you have questions about a medical condition or this instruction, always ask your healthcare professional. [place] [address] any warranty or liability for your use of this information. JiaThis Announcement We are excited to announce that we are making your provider's discharge notes available to you in [email or web address]. You will see these notes when they are completed and signed by the physician that discharged you from your recent hospital stay. If you have any questions or concerns about any information you see in [employer], please call the Health Information Department where you were seen or reach out to your Primary Care Provider for more information about your plan of care. Introducing Sundar Flores As a Luis Manuel[facility]aira Soria patient, I wanted to make you aware of our electronic visit tool called Sundar Flores. Wenwo 24/7 allows you to connect within minutes with a medical provider 24 hours a day, seven days a week via a mobile device or tablet or logging into a secure website from your computer. You can access Sundar Flores from anywhere in the [place]. A virtual visit might be right for you when you have a simple condition and feel like you just dont want to get out of bed, or cant get away from work for an appointment, when your regular [facility] provider is not available (evenings, weekends or holidays), or when youre out of town and need minor care. Electronic visits cost only $49 and if the [facility] 24/7 provider determines a prescription is needed to treat your condition, one can be electronically transmitted to a nearby pharmacy*. Please take a moment to enroll today if you have not already done so. The enrollment process is free and takes just a few minutes. To enroll, please download the Wenwo 24/7 joel to your tablet or phone, or visit [email or web address] to enroll on your computer. And, as an [address] [place] [address] patient with a Racktivity account, the results of your visits will be scanned into your electronic medical record and your primary care provider will be able to view the scanned results. We urge you to continue to see your regular [employer] provider for your ongoing medical care. And while your primary care provider may not be the one available when you seek a Ntractivereyesfin virtual visit, the peace of mind you get from getting a real diagnosis real time can be priceless. For more information on Ntractivereyesfin, view our Frequently Asked Questions (FAQs) at [email or web address]. Sincerely, 
 
Tracy Canela MD 
Chief Medical Officer [address] *:  certain medications cannot be prescribed via Ntractivereyesfin Providers Seen During Your Hospitalization Provider Specialty Primary office phone Lalito Mckeon DO Emergency Medicine [phone] Suyr Edouard MD Internal Medicine [phone] Trung Lloyd MD Internal Medicine [phone] Your Primary Care Physician (PCP) Primary Care Physician Office Phone Office Fax Bekah Xiongm [phone] You are allergic to the following Allergen Reactions Sulfa (Sulfonamide Antibiotics) Nausea and Vomiting Recent Documentation Height Weight BMI OB Status 1.499 m 44 kg 19.59 kg/m2 Hysterectomy Emergency Contacts Name Discharge Info Relation Home Work Mobile [facility] [place] CAREGIVER [3] Son [22] [phone] Patient Belongings The following personal items are in your possession at time of discharge: 
     Visual Aid: Glasses, With patient Please provide this summary of care documentation to your next provider. Signatures-by signing, you are acknowledging that this After Visit Summary has been reviewed with you and you have received a copy. Patient Signature:  ____________________________________________________________ Date:  ____________________________________________________________  
  
Bela Aguayo  Provider Signature: ____________________________________________________________ Date:  ____________________________________________________________

## 2018-08-10 NOTE — ED PROVIDER NOTES
HPI Comments: 76 y.o. female with past medical history significant for hypertension and breast cancer who presents ambulatory from home with chief complaint of abdominal pain. Patient reports extensive abdominal pain since 1 month ago. Patient reports being diagnosed with a UTI on 06/29/18. She was prescribed bactrim for her UTI. She notes vomiting 3 hours after taking the medicine and was evaluated by her PCP again. She was then prescribed another medication for UTI and a medication for nausea. She then notes intermittent abdominal cramping and constipation. Patient was evaluated by her PCP on 07/05/18 who prescribed her MiraLax. She then notes nausea and continued abdominal cramping. She was then prescribed 20 mg of Prilosec and 2 suppositories between 07/07/18 and 07/08/18. She was evaluated by her PCP again on 07/09/18 who told her to continue to take the Prilosec and to eat. She was evaluated by a gastroenterology PA on 07/31/18 who ordered a CT abdomen and pelvis scan and prescribed Metamucil. Patient complains of abdominal cramping last night but denies pain today. She notes her last bowel movement this morning. Patient notes minimal PO intake this morning. Patient weighed 108 pounds 1 month ago and losing 10 pounds since then. Of note, patient had a right mastectomy in 2016 for breast cancer. She denies history of abdominal surgeries. She also denies hypercholesterolemia. She notes that her blood pressure is managed by Lisinopril. Pertinent negatives include nausea, vomiting, blood in stool, and dysuria. There are no other acute medical concerns at this time. Old Chart Review: Patient's CT of abdomen and pelvis shows a partial small bowel obstruction and small ascites. Her creatinine was 1.0 earlier.      Social hx: Denies tobacco use; denies alcohol use  PCP: Ledy Zamora MD    Note written by Arpan Randall, as dictated by Scooter Vazquez DO 11:40 AM      The history is provided by the patient. No  was used. Past Medical History:   Diagnosis Date    Breast CA ([address]     Rightside    Hypertension        Past Surgical History:   Procedure Laterality Date    HX MASTECTOMY Right     HX PARTIAL THYROIDECTOMY           No family history on file. Social History     Social History    Marital status:      Spouse name: N/A    Number of children: N/A    Years of education: N/A     Occupational History    Not on file. Social History Main Topics    Smoking status: Not on file    Smokeless tobacco: Not on file    Alcohol use Not on file    Drug use: Not on file    Sexual activity: Not on file     Other Topics Concern    Not on file     Social History Narrative    No narrative on file         ALLERGIES: Sulfa (sulfonamide antibiotics)    Review of Systems   Gastrointestinal: Positive for abdominal pain (intermittent) and constipation (intermittent). Negative for blood in stool, nausea and vomiting. Genitourinary: Negative for dysuria. All other systems reviewed and are negative. Vitals:    08/10/18 1051   BP: 131/73   Pulse: 98   Resp: 16   Temp: 97.8 °F (36.6 °C)   SpO2: 96%   Weight: 44 kg (97 lb)   Height: 4' 11\" (1.499 m)            Physical Exam      Constitutional: Pt is awake and alert. Pt appears well-developed and well-nourished. NAD. HENT:   Head: Normocephalic and atraumatic. Nose: Nose normal.   Mouth/Throat: Oropharynx is clear and moist. No oropharyngeal exudate. Eyes: Conjunctivae and extraocular motions are normal. Pupils are equal, round, and reactive to light. Right eye exhibits no discharge. Left eye exhibits no discharge. No scleral icterus. Neck: No tracheal deviation present. Supple neck. Cardiovascular: Normal rate, regular rhythm, normal heart sounds and intact distal pulses. Exam reveals no gallop and no friction rub. No murmur heard.   Pulmonary/Chest: Effort normal and breath sounds normal.  Pt  has no wheezes. Pt  has no rales. Abdominal: Soft. Pt  exhibits no distension and no mass. Mild central tenderness. Pt  has no rebound and no guarding. Musculoskeletal:  Pt  exhibits no edema and no tenderness. Ext: Normal ROM in all four extremities; not tender to palpation; distal pulses are normal, no edema. Neurological:  Pt is alert. nonfocal neuro exam.  Skin: Skin is warm and dry. Pt  is not diaphoretic. Psychiatric:  Pt  has a normal mood and affect. Behavior is normal.     Note written by Arpan Willams, as dictated by Brandon Hernandez DO 11:40 AM          [facility]      ED Course       Procedures         1:03 PM - after multiple failed nursing attempts, I placed a L wrist peripheral 22 g iv. Brandon Hernandez DO      ED EKG interpretation:  Rhythm: normal sinus rhythm with occasional PVCs; Rate (approx.): 89; Axis: left axis deviation; ST/T wave: No acute ST or T wave changes; Lots of artifacts. Note written by Arpan Willams, as dictated by Brandon Hernandez DO 2:00 PM      Consulted Surgery  Consulted hospitalist, Dr Grant Early to admit.     Low K    Low Mg  ivf  Admit  Recent Results (from the past 12 hour(s))   POC CREATININE    Collection Time: 08/10/18  7:16 AM   Result Value Ref Range    Creatinine (POC) 1.0 0.6 - 1.3 mg/dL    GFRAA, POC >60 >60 ml/min/1.73m2    GFRNA, POC 53 (L) >60 TF/JAU/3.86F1   METABOLIC PANEL, BASIC    Collection Time: 08/10/18 12:14 PM   Result Value Ref Range    Sodium 138 136 - 145 mmol/L    Potassium 2.4 (LL) 3.5 - 5.1 mmol/L    Chloride 100 97 - 108 mmol/L    CO2 27 21 - 32 mmol/L    Anion gap 11 5 - 15 mmol/L    Glucose 108 (H) 65 - 100 mg/dL    BUN 15 6 - 20 MG/DL    Creatinine 0.91 0.55 - 1.02 MG/DL    BUN/Creatinine ratio 16 12 - 20      GFR est AA >60 >60 ml/min/1.73m2    GFR est non-AA 60 (L) >60 ml/min/1.73m2    Calcium 8.7 8.5 - 10.1 MG/DL   MAGNESIUM    Collection Time: 08/10/18 12:14 PM   Result Value Ref Range    Magnesium 1.4 (L) 1.6 - 2.4 mg/dL   URINALYSIS W/MICROSCOPIC    Collection Time: 08/10/18 12:15 PM   Result Value Ref Range    Color YELLOW/STRAW      Appearance CLEAR CLEAR      Specific gravity <1.005 1.003 - 1.030    pH (UA) 6.0 5.0 - 8.0      Protein NEGATIVE  NEG mg/dL    Glucose NEGATIVE  NEG mg/dL    Ketone NEGATIVE  NEG mg/dL    Bilirubin NEGATIVE  NEG      Blood MODERATE (A) NEG      Urobilinogen 0.2 0.2 - 1.0 EU/dL    Nitrites NEGATIVE  NEG      Leukocyte Esterase NEGATIVE  NEG      WBC 0-4 0 - 4 /hpf    RBC 20-50 0 - 5 /hpf    Epithelial cells FEW FEW /lpf    Bacteria NEGATIVE  NEG /hpf    Hyaline cast 0-2 0 - 5 /lpf   URINE CULTURE HOLD SAMPLE    Collection Time: 08/10/18 12:15 PM   Result Value Ref Range    Urine culture hold        URINE ON HOLD IN MICROBIOLOGY DEPT FOR 3 DAYS. IF UNPRESERVED URINE IS SUBMITTED, IT CANNOT BE USED FOR ADDITIONAL TESTING AFTER 24 HRS, RECOLLECTION WILL BE REQUIRED. CBC WITH AUTOMATED DIFF    Collection Time: 08/10/18  1:06 PM   Result Value Ref Range    WBC 11.7 (H) 3.6 - 11.0 K/uL    RBC 3.72 (L) 3.80 - 5.20 M/uL    HGB 10.9 (L) 11.5 - 16.0 g/dL    HCT 33.1 (L) 35.0 - 47.0 %    MCV 89.0 80.0 - 99.0 FL    MCH 29.3 26.0 - 34.0 PG    MCHC 32.9 30.0 - 36.5 g/dL    RDW 14.2 11.5 - 14.5 %    PLATELET 996 693 - 644 K/uL    MPV 9.9 8.9 - 12.9 FL    NRBC 0.0 0  WBC    ABSOLUTE NRBC 0.00 0.00 - 0.01 K/uL    NEUTROPHILS 84 (H) 32 - 75 %    LYMPHOCYTES 7 (L) 12 - 49 %    MONOCYTES 8 5 - 13 %    EOSINOPHILS 0 0 - 7 %    BASOPHILS 0 0 - 1 %    IMMATURE GRANULOCYTES 0 0.0 - 0.5 %    ABS. NEUTROPHILS 9.8 (H) 1.8 - 8.0 K/UL    ABS. LYMPHOCYTES 0.9 0.8 - 3.5 K/UL    ABS. MONOCYTES 0.9 0.0 - 1.0 K/UL    ABS. EOSINOPHILS 0.0 0.0 - 0.4 K/UL    ABS. BASOPHILS 0.0 0.0 - 0.1 K/UL    ABS. IMM.  GRANS. 0.0 0.00 - 0.04 K/UL    DF AUTOMATED     EKG, 12 LEAD, INITIAL    Collection Time: 08/10/18  1:13 PM   Result Value Ref Range    Ventricular Rate 89 BPM    Atrial Rate 85 BPM    QRS Duration 72 ms    Q-T Interval 352 ms    QTC Calculation (Bezet) 428 ms    Calculated R Axis -34 degrees    Calculated T Axis 30 degrees    Diagnosis       Accelerated Junctional rhythm with occasional premature ventricular complexes  Left axis deviation  Low voltage QRS  Septal infarct , age undetermined  Abnormal ECG  No previous ECGs available

## 2018-08-10 NOTE — ED NOTES
Bedside and Verbal shift change report given to Patricia Hannah (oncoming nurse) by Tk Nunez (offgoing nurse). Report included the following information SBAR and ED Summary.

## 2018-08-11 VITALS
OXYGEN SATURATION: 96 % | WEIGHT: 97 LBS | HEART RATE: 90 BPM | RESPIRATION RATE: 20 BRPM | HEIGHT: 59 IN | BODY MASS INDEX: 19.56 KG/M2 | SYSTOLIC BLOOD PRESSURE: 136 MMHG | DIASTOLIC BLOOD PRESSURE: 70 MMHG | TEMPERATURE: 97.5 F

## 2018-08-11 LAB
ALBUMIN SERPL-MCNC: 2.3 G/DL (ref 3.5–5)
ALBUMIN/GLOB SERPL: 0.7 {RATIO} (ref 1.1–2.2)
ALP SERPL-CCNC: 41 U/L (ref 45–117)
ALT SERPL-CCNC: 20 U/L (ref 12–78)
ANION GAP SERPL CALC-SCNC: 8 MMOL/L (ref 5–15)
AST SERPL-CCNC: 27 U/L (ref 15–37)
BILIRUB SERPL-MCNC: 0.4 MG/DL (ref 0.2–1)
BUN SERPL-MCNC: 12 MG/DL (ref 6–20)
BUN/CREAT SERPL: 16 (ref 12–20)
CALCIUM SERPL-MCNC: 7.7 MG/DL (ref 8.5–10.1)
CHLORIDE SERPL-SCNC: 108 MMOL/L (ref 97–108)
CO2 SERPL-SCNC: 26 MMOL/L (ref 21–32)
CREAT SERPL-MCNC: 0.74 MG/DL (ref 0.55–1.02)
ERYTHROCYTE [DISTWIDTH] IN BLOOD BY AUTOMATED COUNT: 14.4 % (ref 11.5–14.5)
GLOBULIN SER CALC-MCNC: 3.1 G/DL (ref 2–4)
GLUCOSE SERPL-MCNC: 102 MG/DL (ref 65–100)
HCT VFR BLD AUTO: 27.4 % (ref 35–47)
HGB BLD-MCNC: 9.2 G/DL (ref 11.5–16)
LACTATE SERPL-SCNC: 0.8 MMOL/L (ref 0.4–2)
LIPASE SERPL-CCNC: 209 U/L (ref 73–393)
MAGNESIUM SERPL-MCNC: 1.6 MG/DL (ref 1.6–2.4)
MCH RBC QN AUTO: 29.4 PG (ref 26–34)
MCHC RBC AUTO-ENTMCNC: 33.6 G/DL (ref 30–36.5)
MCV RBC AUTO: 87.5 FL (ref 80–99)
NRBC # BLD: 0 K/UL (ref 0–0.01)
NRBC BLD-RTO: 0 PER 100 WBC
PHOSPHATE SERPL-MCNC: 1.9 MG/DL (ref 2.6–4.7)
PLATELET # BLD AUTO: 221 K/UL (ref 150–400)
PMV BLD AUTO: 9.8 FL (ref 8.9–12.9)
POTASSIUM SERPL-SCNC: 3.4 MMOL/L (ref 3.5–5.1)
PROT SERPL-MCNC: 5.4 G/DL (ref 6.4–8.2)
RBC # BLD AUTO: 3.13 M/UL (ref 3.8–5.2)
SODIUM SERPL-SCNC: 142 MMOL/L (ref 136–145)
WBC # BLD AUTO: 9.8 K/UL (ref 3.6–11)

## 2018-08-11 PROCEDURE — 74011250637 HC RX REV CODE- 250/637: Performed by: INTERNAL MEDICINE

## 2018-08-11 PROCEDURE — 36415 COLL VENOUS BLD VENIPUNCTURE: CPT | Performed by: INTERNAL MEDICINE

## 2018-08-11 PROCEDURE — 84100 ASSAY OF PHOSPHORUS: CPT | Performed by: INTERNAL MEDICINE

## 2018-08-11 PROCEDURE — 74011250636 HC RX REV CODE- 250/636: Performed by: INTERNAL MEDICINE

## 2018-08-11 PROCEDURE — 80053 COMPREHEN METABOLIC PANEL: CPT | Performed by: INTERNAL MEDICINE

## 2018-08-11 PROCEDURE — 83690 ASSAY OF LIPASE: CPT | Performed by: INTERNAL MEDICINE

## 2018-08-11 PROCEDURE — 83605 ASSAY OF LACTIC ACID: CPT | Performed by: INTERNAL MEDICINE

## 2018-08-11 PROCEDURE — 83735 ASSAY OF MAGNESIUM: CPT | Performed by: INTERNAL MEDICINE

## 2018-08-11 PROCEDURE — 85027 COMPLETE CBC AUTOMATED: CPT | Performed by: INTERNAL MEDICINE

## 2018-08-11 RX ORDER — MAGNESIUM SULFATE HEPTAHYDRATE 40 MG/ML
2 INJECTION, SOLUTION INTRAVENOUS ONCE
Status: COMPLETED | OUTPATIENT
Start: 2018-08-11 | End: 2018-08-11

## 2018-08-11 RX ORDER — HYDROCHLOROTHIAZIDE 25 MG/1
12.5 TABLET ORAL
Status: DISCONTINUED | OUTPATIENT
Start: 2018-08-11 | End: 2018-08-11

## 2018-08-11 RX ORDER — LEVOTHYROXINE SODIUM 75 UG/1
75 TABLET ORAL
Status: DISCONTINUED | OUTPATIENT
Start: 2018-08-11 | End: 2018-08-11 | Stop reason: HOSPADM

## 2018-08-11 RX ORDER — AMOXICILLIN 250 MG
1 CAPSULE ORAL 2 TIMES DAILY
Qty: 60 TAB | Refills: 1 | Status: SHIPPED | OUTPATIENT
Start: 2018-08-11

## 2018-08-11 RX ORDER — LISINOPRIL 10 MG/1
10 TABLET ORAL
Qty: 30 TAB | Refills: 1 | Status: SHIPPED | OUTPATIENT
Start: 2018-08-11

## 2018-08-11 RX ORDER — TAMOXIFEN CITRATE 10 MG/1
20 TABLET, FILM COATED ORAL
Status: DISCONTINUED | OUTPATIENT
Start: 2018-08-11 | End: 2018-08-11 | Stop reason: HOSPADM

## 2018-08-11 RX ORDER — LISINOPRIL 5 MG/1
10 TABLET ORAL
Status: DISCONTINUED | OUTPATIENT
Start: 2018-08-11 | End: 2018-08-11 | Stop reason: HOSPADM

## 2018-08-11 RX ORDER — ONDANSETRON 4 MG/1
4 TABLET, ORALLY DISINTEGRATING ORAL
Qty: 20 TAB | Refills: 0 | Status: SHIPPED | OUTPATIENT
Start: 2018-08-11

## 2018-08-11 RX ORDER — FACIAL-BODY WIPES
10 EACH TOPICAL DAILY PRN
Status: DISCONTINUED | OUTPATIENT
Start: 2018-08-11 | End: 2018-08-11 | Stop reason: HOSPADM

## 2018-08-11 RX ADMIN — LEVOTHYROXINE SODIUM 75 MCG: 75 TABLET ORAL at 00:40

## 2018-08-11 RX ADMIN — LISINOPRIL 10 MG: 5 TABLET ORAL at 00:40

## 2018-08-11 RX ADMIN — TAMOXIFEN CITRATE 20 MG: 10 TABLET, FILM COATED ORAL at 00:40

## 2018-08-11 RX ADMIN — SODIUM CHLORIDE AND POTASSIUM CHLORIDE: 9; 2.98 INJECTION, SOLUTION INTRAVENOUS at 03:10

## 2018-08-11 RX ADMIN — HYDROCHLOROTHIAZIDE 12.5 MG: 25 TABLET ORAL at 00:40

## 2018-08-11 RX ADMIN — MAGNESIUM SULFATE HEPTAHYDRATE 2 G: 40 INJECTION, SOLUTION INTRAVENOUS at 08:57

## 2018-08-11 NOTE — DISCHARGE INSTRUCTIONS
HOSPITALIST DISCHARGE INSTRUCTIONS  NAME: Citlali Oviedo   :  1940   MRN:  [id number]     Date/Time:  2018 10:59 AM    ADMIT DATE: 8/10/2018     DISCHARGE DATE: 2018     ADMITTING DIAGNOSIS:  Partial small bowel obstruction    DISCHARGE DIAGNOSIS:  same    MEDICATIONS:  See after visit summary       · It is important that you take the medication exactly as they are prescribed. · Keep your medication in the bottles provided by the pharmacist and keep a list of the medication names, dosages, and times to be taken in your wallet. · Do not take other medications without consulting your doctor     Pain Management: per above medications    What to do at Home    Recommended diet:  [address], low fat    Recommended activity: Activity as tolerated    1) Return to the hospital if you feel worse    2) If you experience any of the following symptoms then please call your primary care physician or return to the emergency room if you cannot get hold of your doctor:  Fever, chills, nausea, vomiting, diarrhea, change in mentation, falling, bleeding, shortness of breath, chest pain, severe headache, severe abdominal pain,     3) Stop taking HCTZ (blood pressure medication) due to dehydration, low blood pressure. You can still take the low dose lisinopril    4) Follow up with GI doctors for an EGD and colonoscopy    Follow Up: Follow-up Information     Follow up With Details Comments Contact Info    Luanne Schaumann, MD Schedule an appointment as soon as possible for a visit in 1 week  [address] [address]  [phone]      Bj Paulino MD Schedule an appointment as soon as possible for a visit in 1 week  [address] [id number] [phone]              Information obtained by :  I understand that if any problems occur once I am at home I am to contact my physician.     I understand and acknowledge receipt of the instructions indicated above. Physician's or R.N.'s Signature                                                                  Date/Time                                                                                                                                              Patient or Representative Signature                                                          Date/Time         Bowel Blockage (Intestinal Obstruction): Care Instructions  Your Care Instructions  A bowel blockage, also called an intestinal obstruction, can prevent gas, fluids, or solids from moving through the intestines normally. It can cause constipation and, rarely, diarrhea. You may have pain, nausea, vomiting, and cramping. Most of the time, complete blockages require a stay in the hospital and possibly surgery. But if your bowel is only partly blocked, your doctor may tell you to wait until it clears on its own and you are able to pass gas and stool. If so, there are things you can do at home to help make you feel better. If you have had surgery for a bowel blockage, there are things you can do at home to make sure you heal well. You can also make some changes to keep your bowel from becoming blocked again. Follow-up care is a key part of your treatment and safety. Be sure to make and go to all appointments, and call your doctor if you are having problems. It's also a good idea to know your test results and keep a list of the medicines you take. How can you care for yourself at home? If your doctor has told you to wait at home for a blockage to clear on its own:  · Follow your doctor's instructions. These may include eating a liquid diet to avoid complete blockage. · Be safe with medicines. Take your medicines exactly as prescribed.  Call your doctor if you think you are having a problem with your medicine. · Put a heating pad set on low on your belly to relieve mild cramps and pain. To prevent another blockage  · Try to eat smaller amounts of food more often. For example, have 5 or 6 small meals throughout the day instead of 2 or 3 large meals. · Chew your food very well. Try to chew each bite about 20 times or until it is liquid. · Avoid high-fiber foods and raw fruits and vegetables with skins, husks, strings, or seeds. These can form a ball of undigested material that can cause a blockage if a part of your bowel is scarred or narrowed. · Check with your doctor before you eat whole-grain products or use a fiber supplement such as Citrucel or Metamucil. · To help you have regular bowel movements, eat at regular times, do not strain during a bowel movement, and drink at least 8 to 10 glasses of water each day. If you have kidney, heart, or liver disease and have to limit fluids, talk with your doctor or before you increase the amount of fluids you drink. · Drink high-calorie liquid formulas if your doctor says to. Severe symptoms may make it hard for your body to take in vitamins and minerals. · Get regular exercise. It helps you digest your food better. Get at least 30 minutes of physical activity on most days of the week. Walking is a good choice. When should you call for help? Call your doctor now or seek immediate medical care if:    · You have a fever.     · You are vomiting.     · You have new or worse belly pain.     · You cannot pass stools or gas.    Watch closely for changes in your health, and be sure to contact your doctor if you have any problems. Where can you learn more? Go to http://meryl-madhuri.info/. Enter N852 in the search box to learn more about \"Bowel Blockage (Intestinal Obstruction): Care Instructions. \"  Current as of: May 12, 2017  Content Version: 11.7  © 4672-1044 [employer].  Care instructions adapted under license by [employer] (which disclaims liability or warranty for this information). If you have questions about a medical condition or this instruction, always ask your healthcare professional. [address] any warranty or liability for your use of this information.

## 2018-08-11 NOTE — ROUTINE PROCESS
Discharge instructions reviewed with patient, patient received a copy and signed our paper copy that was put into the chart. PIV removed. Patient given prescriptions and wheeled down by volunteer with no additional questions.

## 2018-08-11 NOTE — ED NOTES
TRANSFER - OUT REPORT:    Verbal report given to Nishant(name) on Erika Franco  being transferred to Fifth floor(unit) for routine progression of care       Report consisted of patients Situation, Background, Assessment and   Recommendations(SBAR). Information from the following report(s) SBAR, ED Summary and Procedure Summary was reviewed with the receiving nurse. Lines:       Opportunity for questions and clarification was provided.       Patient transported with:   [employer]

## 2018-08-11 NOTE — ROUTINE PROCESS
Bedside and Verbal shift change report given to Raffi Michele RN (oncoming nurse) by Phil Kwon RN (offgoing nurse). Report included the following information SBAR, Kardex, Procedure Summary, Intake/Output, MAR, Accordion, Recent Results and Med Rec Status.

## 2018-08-11 NOTE — PROGRESS NOTES
General Surgery Daily Progress Note    Patient: Piper Caberra MRN: [id number]  SSN: [id number]    YOB: 1940  Age: 76 y.o. Sex: female      Admit Date: 8/10/2018    Subjective:   Patient denies any abd pain, nausea or vomiting. She had a full meal last night without any pain. She had a good BM today. Current Facility-Administered Medications   Medication Dose Route Frequency    levothyroxine (SYNTHROID) tablet 75 mcg  75 mcg Oral 6am    tamoxifen (NOLVADEX) tablet 20 mg  20 mg Oral QHS    lisinopril (PRINIVIL, ZESTRIL) tablet 10 mg  10 mg Oral QHS    bisacodyl (DULCOLAX) suppository 10 mg  10 mg Rectal DAILY PRN    potassium phosphate 30 mmol in 0.9% sodium chloride 250 mL infusion   IntraVENous ONCE    0.9% sodium chloride with KCl 40 mEq/L infusion   IntraVENous CONTINUOUS    acetaminophen (TYLENOL) tablet 650 mg  650 mg Oral Q4H PRN    diphenhydrAMINE (BENADRYL) capsule 25 mg  25 mg Oral Q4H PRN    ondansetron (ZOFRAN) injection 4 mg  4 mg IntraVENous Q4H PRN    enoxaparin (LOVENOX) injection 30 mg  30 mg SubCUTAneous Q24H        Objective:      08/09 1901 - 08/11 0700  In: -   Out: 300   Patient Vitals for the past 8 hrs:   BP Temp Pulse Resp SpO2   08/11/18 0903 136/70 97.5 °F (36.4 °C) 90 20 96 %   08/11/18 0413 102/70 98.9 °F (37.2 °C) 91 20 97 %       Physical Exam:  General: Alert, cooperative, no distress, appears stated age. Neck:  Supple, symmetrical, trachea midline, no adenopathy, thyroid: no                           enlargement/tenderness/nodules, no carotid bruit and no JVD. Lungs: Clear to auscultation bilaterally. Heart:  Regular rate and rhythm, S1, S2 normal, no murmur, click, rub or gallop. Abdomen: Soft, non-tender. Bowel sounds normal. No masses,  No organomegaly. Extremities: Extremities normal, atraumatic, no cyanosis or edema.   Skin:  Skin color, texture, turgor normal. No rashes or lesions    Labs: Recent Labs      08/11/18   0515   WBC  9.8   HGB  9.2* HCT  27.4*   PLT  221     Recent Labs      08/11/18   0515   NA  142   K  3.4*   CL  108   CO2  26   GLU  102*   BUN  12   CREA  0.74   CA  7.7*   MG  1.6   PHOS  1.9*   ALB  2.3*   TBILI  0.4   SGOT  27   ALT  20     X-ray   Assessment / Plan:   · Symptoms resolved  · No evidence of bowel obstruction  · Discussed with Dr Jimmy Chiang - she needs GI workup and can be done so as an outpatient    Principal Problem:    Partial small bowel obstruction ([address] (8/10/2018)    Active Problems:    Leukocytosis (8/10/2018)      Anemia (8/10/2018)      Hypokalemia (8/10/2018)      Hypertension ()      Breast CA (HCC) ()      Overview: Rightside      Hypothyroidism ()      Weight loss (8/10/2018)      Malnutrition ([address] (8/10/2018)      Abdominal pain (8/10/2018)

## 2018-08-11 NOTE — DISCHARGE SUMMARY
[facility] [address]  [address] [facility], [address] [place] [address]  [phone]    Physician Discharge Summary     Patient ID:  Tacho Garcia  [id number]  76 y.o.  1940    Admit date: 8/10/2018    Discharge date and time: 8/11/2018    Admission Diagnoses: Partial small bowel obstruction [facility])    Discharge Diagnoses:  Principal Diagnosis Partial small bowel obstruction (HCC)                                            Principal Problem:    Partial small bowel obstruction ([address] (8/10/2018)    Active Problems:    Leukocytosis (8/10/2018)      Anemia (8/10/2018)      Hypokalemia (8/10/2018)      Hypertension ()      Breast CA ([address] ()      Overview: Rightside      Hypothyroidism ()      Weight loss (8/10/2018)      Malnutrition ([address] (8/10/2018)      Abdominal pain (8/10/2018)           Hospital Course:     75 yo hx of HTN, hypothyroid, breast CA, presented w/ abd pain x1 month, weight loss, partial SBO    1) Chronic abd pain/partial SBO: unclear etiology. Abd pain improved with IVF. CT neg for masses. Patient will need to f/u with GI for a colonoscopy and EGD. Surgery was also following, no surgical interventions needed    2) Unintentional wt loss: underweight on admission. Will need further outpatient w/u    3) HTN: HCTZ was d/c due to dehydration. Cont low dose lisinopril     4) Hypothyroid: cont synthroid    5) Hx of breast CA: cont tamoxifen.   F/u with [facility]    6) Hypokalemia: repleted    PCP: Pedro Gonzalez MD     Consults: GI, Gen surg, oncology    Significant Diagnostic Studies: abd CT    Discharge Exam:  Physical Exam:    Gen: Elderly, thin, frail, NAD  HEENT:  Pink conjunctivae, PERRL, hearing intact to voice, moist mucous membranes  Neck: Supple, without masses, thyroid non-tender  Resp: No accessory muscle use, clear breath sounds without wheezes rales or rhonchi  Card: No murmurs, normal S1, S2 without thrills  Abd:  Soft, non-tender, non-distended, normoactive bowel sounds are present, no palpable organomegaly and no detectable hernias  Lymph:  No cervical or inguinal adenopathy  Musc: No cyanosis or clubbing  Skin: No rashes  Neuro:  Cranial nerves are grossly intact, no focal motor weakness, follows commands appropriately  Psych:  Good insight, oriented to person, place and time, alert    Disposition: home  Discharge Condition: Stable    Patient Instructions:   Current Discharge Medication List      START taking these medications    Details   lisinopril (PRINIVIL, ZESTRIL) 10 mg tablet Take 1 Tab by mouth nightly. Qty: 30 Tab, Refills: 1      senna-docusate (SENNA WITH DOCUSATE SODIUM) 8.6-50 mg per tablet Take 1 Tab by mouth two (2) times a day. Qty: 60 Tab, Refills: 1      ondansetron (ZOFRAN ODT) 4 mg disintegrating tablet Take 1 Tab by mouth every eight (8) hours as needed for Nausea. Qty: 20 Tab, Refills: 0         CONTINUE these medications which have NOT CHANGED    Details   !! levothyroxine (SYNTHROID) 100 mcg tablet Take 50 mcg by mouth every other day. tamoxifen (NOLVADEX) 20 mg tablet Take 20 mg by mouth nightly. lubiPROStone (AMITIZA) 24 mcg capsule Take 24 mcg by mouth two (2) times daily (with meals). !! levothyroxine (SYNTHROID) 100 mcg tablet Take 100 mcg by mouth every other day. !! - Potential duplicate medications found. Please discuss with provider.       STOP taking these medications       lisinopril-hydroCHLOROthiazide (PRINZIDE, ZESTORETIC) 10-12.5 mg per tablet Comments:   Reason for Stopping:             Activity: Activity as tolerated  Diet: Regular Diet  Wound Care: None needed    Follow-up with  Follow-up Information     Follow up With Details Comments Contact Info    Ledy Zamora MD Schedule an appointment as soon as possible for a visit in 1 week  [facility] [address] [place] [address]  [phone]      Jones Rosario MD Schedule an appointment as soon as possible for a visit in 1 week  [address]            Follow-up tests/labs none    Signed:  Ruddy Parr MD  8/11/2018  11:00 AM    I spent 32 min on discharge

## 2018-08-11 NOTE — PROGRESS NOTES
Patient asking to take home medication. States she takes Tamoxifen, Lisinopril, and Synthroid around 11 pm every night. Called Dr. Vincent Montero. MD states to order 3 medications per med rec. Will continue to monitor.

## 2018-08-11 NOTE — ED NOTES
Attempted to call report but nurse not available. Will call back in about ten minutes. Was advised after being on hold for about 5 minutes.